# Patient Record
Sex: FEMALE | Race: WHITE | NOT HISPANIC OR LATINO | Employment: OTHER | URBAN - METROPOLITAN AREA
[De-identification: names, ages, dates, MRNs, and addresses within clinical notes are randomized per-mention and may not be internally consistent; named-entity substitution may affect disease eponyms.]

---

## 2017-03-16 ENCOUNTER — HOSPITAL ENCOUNTER (OUTPATIENT)
Dept: LAB | Facility: MEDICAL CENTER | Age: 72
End: 2017-03-16
Attending: FAMILY MEDICINE
Payer: MEDICARE

## 2017-03-16 ENCOUNTER — HOSPITAL ENCOUNTER (OUTPATIENT)
Dept: RADIOLOGY | Facility: MEDICAL CENTER | Age: 72
End: 2017-03-16
Attending: FAMILY MEDICINE
Payer: MEDICARE

## 2017-03-16 DIAGNOSIS — M54.2 CERVICALGIA: ICD-10-CM

## 2017-03-16 LAB
ALBUMIN SERPL BCP-MCNC: 3.9 G/DL (ref 3.2–4.9)
ALBUMIN/GLOB SERPL: 1.4 G/DL
ALP SERPL-CCNC: 51 U/L (ref 30–99)
ALT SERPL-CCNC: 13 U/L (ref 2–50)
ANION GAP SERPL CALC-SCNC: 4 MMOL/L (ref 0–11.9)
AST SERPL-CCNC: 25 U/L (ref 12–45)
BILIRUB SERPL-MCNC: 1.2 MG/DL (ref 0.1–1.5)
BUN SERPL-MCNC: 15 MG/DL (ref 8–22)
CALCIUM SERPL-MCNC: 9.4 MG/DL (ref 8.5–10.5)
CHLORIDE SERPL-SCNC: 104 MMOL/L (ref 96–112)
CO2 SERPL-SCNC: 28 MMOL/L (ref 20–33)
CREAT SERPL-MCNC: 0.95 MG/DL (ref 0.5–1.4)
GLOBULIN SER CALC-MCNC: 2.7 G/DL (ref 1.9–3.5)
GLUCOSE SERPL-MCNC: 101 MG/DL (ref 65–99)
POTASSIUM SERPL-SCNC: 4.1 MMOL/L (ref 3.6–5.5)
PROT SERPL-MCNC: 6.6 G/DL (ref 6–8.2)
SODIUM SERPL-SCNC: 136 MMOL/L (ref 135–145)
T4 FREE SERPL-MCNC: 0.93 NG/DL (ref 0.53–1.43)
TSH SERPL DL<=0.005 MIU/L-ACNC: 0.22 UIU/ML (ref 0.3–3.7)

## 2017-03-16 PROCEDURE — 36415 COLL VENOUS BLD VENIPUNCTURE: CPT

## 2017-03-16 PROCEDURE — 84443 ASSAY THYROID STIM HORMONE: CPT

## 2017-03-16 PROCEDURE — 72052 X-RAY EXAM NECK SPINE 6/>VWS: CPT

## 2017-03-16 PROCEDURE — 86235 NUCLEAR ANTIGEN ANTIBODY: CPT | Mod: 91

## 2017-03-16 PROCEDURE — 80053 COMPREHEN METABOLIC PANEL: CPT

## 2017-03-16 PROCEDURE — 86038 ANTINUCLEAR ANTIBODIES: CPT

## 2017-03-16 PROCEDURE — 84439 ASSAY OF FREE THYROXINE: CPT

## 2017-03-16 PROCEDURE — 86225 DNA ANTIBODY NATIVE: CPT

## 2017-03-20 LAB
DSDNA AB TITR SER CLIF: ABNORMAL {TITER}
ENA SM IGG SER-ACNC: 0 AU/ML (ref 0–40)
ENA SS-B IGG SER IA-ACNC: 0 AU/ML (ref 0–40)
NUCLEAR IGG SER QL IA: DETECTED
NUCLEAR IGG TITR SER IF: ABNORMAL {TITER}
SSA52 R0ENA AB IGG Q0420: 2 AU/ML (ref 0–40)
SSA60 R0ENA AB IGG Q0419: 0 AU/ML (ref 0–40)
U1 SNRNP IGG SER QL: 0 AU/ML (ref 0–40)

## 2017-05-04 ENCOUNTER — HOSPITAL ENCOUNTER (OUTPATIENT)
Dept: RADIOLOGY | Facility: MEDICAL CENTER | Age: 72
End: 2017-05-04
Attending: ORTHOPAEDIC SURGERY
Payer: MEDICARE

## 2017-05-04 DIAGNOSIS — S63.591A OTHER SPECIFIED SPRAIN OF RIGHT WRIST, INITIAL ENCOUNTER: ICD-10-CM

## 2017-05-04 PROCEDURE — 73200 CT UPPER EXTREMITY W/O DYE: CPT | Mod: RT

## 2018-04-26 ENCOUNTER — HOSPITAL ENCOUNTER (OUTPATIENT)
Facility: MEDICAL CENTER | Age: 73
End: 2018-04-26
Attending: INTERNAL MEDICINE | Admitting: INTERNAL MEDICINE
Payer: MEDICARE

## 2019-06-07 ENCOUNTER — OFFICE VISIT (OUTPATIENT)
Dept: URGENT CARE | Facility: PHYSICIAN GROUP | Age: 74
End: 2019-06-07
Payer: MEDICARE

## 2019-06-07 ENCOUNTER — HOSPITAL ENCOUNTER (OUTPATIENT)
Facility: MEDICAL CENTER | Age: 74
End: 2019-06-07
Attending: NURSE PRACTITIONER
Payer: MEDICARE

## 2019-06-07 VITALS
SYSTOLIC BLOOD PRESSURE: 128 MMHG | OXYGEN SATURATION: 98 % | RESPIRATION RATE: 14 BRPM | HEIGHT: 70 IN | WEIGHT: 199 LBS | DIASTOLIC BLOOD PRESSURE: 82 MMHG | TEMPERATURE: 98.1 F | HEART RATE: 78 BPM | BODY MASS INDEX: 28.49 KG/M2

## 2019-06-07 DIAGNOSIS — N39.0 URINARY TRACT INFECTION WITH HEMATURIA, SITE UNSPECIFIED: ICD-10-CM

## 2019-06-07 DIAGNOSIS — R31.9 URINARY TRACT INFECTION WITH HEMATURIA, SITE UNSPECIFIED: ICD-10-CM

## 2019-06-07 DIAGNOSIS — R31.9 URINARY TRACT INFECTION WITH HEMATURIA, SITE UNSPECIFIED: Primary | ICD-10-CM

## 2019-06-07 DIAGNOSIS — N39.0 URINARY TRACT INFECTION WITH HEMATURIA, SITE UNSPECIFIED: Primary | ICD-10-CM

## 2019-06-07 PROCEDURE — 87077 CULTURE AEROBIC IDENTIFY: CPT

## 2019-06-07 PROCEDURE — 87086 URINE CULTURE/COLONY COUNT: CPT

## 2019-06-07 PROCEDURE — 87186 SC STD MICRODIL/AGAR DIL: CPT

## 2019-06-07 PROCEDURE — 99214 OFFICE O/P EST MOD 30 MIN: CPT | Performed by: NURSE PRACTITIONER

## 2019-06-07 RX ORDER — LITHIUM CARBONATE 300 MG
300 TABLET ORAL
Refills: 3 | COMMUNITY
Start: 2019-04-18 | End: 2023-12-31

## 2019-06-07 RX ORDER — QUETIAPINE FUMARATE 50 MG/1
TABLET, EXTENDED RELEASE ORAL
COMMUNITY
End: 2023-12-31

## 2019-06-07 RX ORDER — TRAMADOL HYDROCHLORIDE 50 MG/1
50 TABLET ORAL EVERY 4 HOURS PRN
COMMUNITY
End: 2023-12-31

## 2019-06-07 RX ORDER — CIPROFLOXACIN 500 MG/1
500 TABLET, FILM COATED ORAL 2 TIMES DAILY
Qty: 14 TAB | Refills: 0 | Status: SHIPPED | OUTPATIENT
Start: 2019-06-07 | End: 2019-06-14

## 2019-06-07 NOTE — PROGRESS NOTES
Subjective:     Meena Martin is a 74 y.o. female who presents for Dysuria (x 2 weeks)       Dysuria    This is a new problem. The problem has been gradually worsening. There has been no fever. Associated symptoms include frequency and urgency. Pertinent negatives include no nausea or vomiting. Her past medical history is significant for recurrent UTIs.     Patient presents to urgent care with reports of urinary urgency, frequency, dysuria, low back pain, and abdominal pain worsening over the past 2 weeks.  Reports a history of UTIs. Denies nausea, fever, vomiting, or other symptoms.    PMH:  has a past medical history of Arthritis; Backpain; bipolar; Cholecystitis; Diverticulitis; gastroenteritis; Hypothyroidism; IBS (irritable bowel syndrome); Indigestion; Lupus; Other specified symptom associated with female genital organs; and Pyelonephritis. She also has no past medical history of Breast cancer (HCC).    MEDS:   Current Outpatient Prescriptions:   •  lithium (ESKALITH) 300 MG Tab, Take 300 mg by mouth., Disp: , Rfl: 3  •  tramadol (ULTRAM) 50 MG Tab, Take 50 mg by mouth every four hours as needed., Disp: , Rfl:   •  QUEtiapine Fumarate 50 MG TABLET SR 24 HR, Take  by mouth., Disp: , Rfl:   •  ciprofloxacin (CIPRO) 500 MG Tab, Take 1 Tab by mouth 2 times a day for 7 days., Disp: 14 Tab, Rfl: 0  •  gabapentin (NEURONTIN) 100 MG Cap, Take 1 Cap by mouth 2 Times a Day., Disp: 90 Cap, Rfl: 0  •  zolpidem (AMBIEN) 10 MG TABS, Take 10 mg by mouth at bedtime as needed., Disp: , Rfl:   •  hyoscyamine (HYOMAX-SR) 0.375 MG CR tablet, Take 375 mcg by mouth every day., Disp: , Rfl:   •  pantoprazole (PROTONIX) 40 MG TBEC, Take 40 mg by mouth every day., Disp: , Rfl:   •  levothyroxine (SYNTHROID) 112 MCG TABS, Take 112 mcg by mouth every day., Disp: , Rfl:   •  simvastatin (ZOCOR) 40 MG TABS, Take 40 mg by mouth every evening., Disp: , Rfl:   •  Calcium Carbonate-Vit D-Min (CALCIUM 1200 PO), Take  by mouth every day.,  Disp: , Rfl:   •  Cholecalciferol (VITAMIN D-3 PO), Take 1 Tab by mouth every day. Indications: PT DOESN'T KNOW DOSAGE, Disp: , Rfl:   •  benzonatate (TESSALON) 200 MG capsule, Take 1 Cap by mouth 3 times a day as needed for Cough., Disp: 30 Cap, Rfl: 0  •  promethazine-codeine (PHENERGAN-CODEINE) 6.25-10 MG/5ML Syrup, Take 5 mL by mouth 4 times a day as needed for Cough., Disp: 120 mL, Rfl: 0  •  albuterol (VENTOLIN OR PROVENTIL) 108 (90 BASE) MCG/ACT AERS inhalation aerosol, Inhale 2 Puffs by mouth every 6 hours as needed for Shortness of Breath., Disp: 8.5 g, Rfl: 3  •  guaifenesin-codeine (CHERATUSSIN AC) SOLN, Take 5 mL by mouth every four hours as needed for Cough., Disp: 180 mL, Rfl: 0  •  NON SPECIFIED, Take 1 Tab by mouth every day. Indications: Thermatabs OTC, sodium replacement tabs, Disp: , Rfl:   •  CRANBERRY PO, Take 4,200 mg by mouth every day., Disp: , Rfl:     ALLERGIES:   Allergies   Allergen Reactions   • Morphine Anxiety     Red and rashes     SURGHX:   Past Surgical History:   Procedure Laterality Date   • GASTROSCOPY WITH BIOPSY  5/24/2012    Performed by JAY CHAVES at ENDOSCOPY [facility] ORS   • APPENDECTOMY     • CHOLECYSTECTOMY     • GYN SURGERY      hysterectomy   • OTHER      thyroidectomy,      SOCHX:  reports that she has never smoked. She has never used smokeless tobacco. She reports that she does not drink alcohol or use drugs.     FH: Reviewed with patient, not pertinent to this visit.     Review of Systems   Constitutional: Negative.  Negative for fever and malaise/fatigue.   Respiratory: Negative.  Negative for shortness of breath.    Cardiovascular: Negative.  Negative for chest pain.   Gastrointestinal: Positive for abdominal pain. Negative for constipation, diarrhea, nausea and vomiting.   Genitourinary: Positive for dysuria, frequency and urgency.   Musculoskeletal: Positive for back pain.   Neurological: Negative.    All other systems reviewed and are  "negative.    Objective:     /82   Pulse 78   Temp 36.7 °C (98.1 °F)   Resp 14   Ht 1.778 m (5' 10\")   Wt 90.3 kg (199 lb)   SpO2 98%   BMI 28.55 kg/m²     Physical Exam   Constitutional: She is oriented to person, place, and time. She appears well-developed and well-nourished. She is cooperative.  Non-toxic appearance. No distress.   HENT:   Right Ear: External ear normal.   Left Ear: External ear normal.   Nose: Nose normal.   Mouth/Throat: Mucous membranes are normal.   Eyes: Conjunctivae and EOM are normal.   Neck: Normal range of motion.   Cardiovascular: Normal rate, regular rhythm, normal heart sounds and normal pulses.    Pulmonary/Chest: Effort normal and breath sounds normal. No respiratory distress. She has no decreased breath sounds.   Abdominal: Soft. Bowel sounds are normal. There is tenderness in the right lower quadrant, suprapubic area and left lower quadrant. There is CVA tenderness (Right).   Musculoskeletal: Normal range of motion. She exhibits no deformity.   Neurological: She is alert and oriented to person, place, and time. She has normal strength. No sensory deficit.   Skin: Skin is warm, dry and intact. Capillary refill takes less than 2 seconds.   Psychiatric: She has a normal mood and affect. Her behavior is normal.   Vitals reviewed.    UA: cloudy, positive for nitrites, large LE, trace blood       Assessment/Plan:     1. Urinary tract infection with hematuria, site unspecified  - ciprofloxacin (CIPRO) 500 MG Tab; Take 1 Tab by mouth 2 times a day for 7 days.  Dispense: 14 Tab; Refill: 0  - URINE CULTURE(NEW); Future  - POCT Urinalysis    UA cloudy and positive for nitrites, large LE, and trace blood. Patient reports dysuria, urgency, frequency, low back pain, and history of UTIs. Reports that symptoms feel similar. Urine culture pending. Rx sent electronically for Cipro. Patient advised of potential adverse effects of fluoroquinolones.    VS stable, afebrile, NAD. Discussed " close monitoring and supportive measures including increasing fluids and rest as well as OTC symptom management including acetaminophen and/or ibuprofen PRN pain.    Patient advised to: Return for 1) Symptoms don't improve or worsen, or go to ER, 2) Follow up with primary care in 7-10 days.    Differential diagnosis, natural history, supportive care, and indications for immediate follow-up discussed. All questions answered. Patient agrees with the plan of care.

## 2019-06-08 ASSESSMENT — ENCOUNTER SYMPTOMS
CONSTITUTIONAL NEGATIVE: 1
FEVER: 0
BACK PAIN: 1
NEUROLOGICAL NEGATIVE: 1
CARDIOVASCULAR NEGATIVE: 1
RESPIRATORY NEGATIVE: 1
NAUSEA: 0
SHORTNESS OF BREATH: 0
ABDOMINAL PAIN: 1
CONSTIPATION: 0
VOMITING: 0
DIARRHEA: 0

## 2019-06-10 LAB
BACTERIA UR CULT: ABNORMAL
BACTERIA UR CULT: ABNORMAL
SIGNIFICANT IND 70042: ABNORMAL
SITE SITE: ABNORMAL
SOURCE SOURCE: ABNORMAL

## 2019-12-04 ENCOUNTER — HOSPITAL ENCOUNTER (OUTPATIENT)
Dept: LAB | Facility: MEDICAL CENTER | Age: 74
End: 2019-12-04
Attending: FAMILY MEDICINE
Payer: MEDICARE

## 2019-12-04 LAB
ALBUMIN SERPL BCP-MCNC: 4 G/DL (ref 3.2–4.9)
ALBUMIN/GLOB SERPL: 1.4 G/DL
ALP SERPL-CCNC: 51 U/L (ref 30–99)
ALT SERPL-CCNC: 11 U/L (ref 2–50)
ANION GAP SERPL CALC-SCNC: 7 MMOL/L (ref 0–11.9)
AST SERPL-CCNC: 17 U/L (ref 12–45)
BASOPHILS # BLD AUTO: 1.1 % (ref 0–1.8)
BASOPHILS # BLD: 0.06 K/UL (ref 0–0.12)
BILIRUB SERPL-MCNC: 0.7 MG/DL (ref 0.1–1.5)
BUN SERPL-MCNC: 14 MG/DL (ref 8–22)
CALCIUM SERPL-MCNC: 9.2 MG/DL (ref 8.5–10.5)
CHLORIDE SERPL-SCNC: 108 MMOL/L (ref 96–112)
CO2 SERPL-SCNC: 25 MMOL/L (ref 20–33)
CREAT SERPL-MCNC: 1.27 MG/DL (ref 0.5–1.4)
EOSINOPHIL # BLD AUTO: 0.18 K/UL (ref 0–0.51)
EOSINOPHIL NFR BLD: 3.2 % (ref 0–6.9)
ERYTHROCYTE [DISTWIDTH] IN BLOOD BY AUTOMATED COUNT: 46.3 FL (ref 35.9–50)
GLOBULIN SER CALC-MCNC: 2.9 G/DL (ref 1.9–3.5)
GLUCOSE SERPL-MCNC: 105 MG/DL (ref 65–99)
HCT VFR BLD AUTO: 40.6 % (ref 37–47)
HGB BLD-MCNC: 12.9 G/DL (ref 12–16)
IMM GRANULOCYTES # BLD AUTO: 0.01 K/UL (ref 0–0.11)
IMM GRANULOCYTES NFR BLD AUTO: 0.2 % (ref 0–0.9)
LYMPHOCYTES # BLD AUTO: 1.78 K/UL (ref 1–4.8)
LYMPHOCYTES NFR BLD: 31.6 % (ref 22–41)
MCH RBC QN AUTO: 30.1 PG (ref 27–33)
MCHC RBC AUTO-ENTMCNC: 31.8 G/DL (ref 33.6–35)
MCV RBC AUTO: 94.6 FL (ref 81.4–97.8)
MONOCYTES # BLD AUTO: 0.45 K/UL (ref 0–0.85)
MONOCYTES NFR BLD AUTO: 8 % (ref 0–13.4)
NEUTROPHILS # BLD AUTO: 3.15 K/UL (ref 2–7.15)
NEUTROPHILS NFR BLD: 55.9 % (ref 44–72)
NRBC # BLD AUTO: 0 K/UL
NRBC BLD-RTO: 0 /100 WBC
PLATELET # BLD AUTO: 253 K/UL (ref 164–446)
PMV BLD AUTO: 10.8 FL (ref 9–12.9)
POTASSIUM SERPL-SCNC: 4.4 MMOL/L (ref 3.6–5.5)
PROT SERPL-MCNC: 6.9 G/DL (ref 6–8.2)
RBC # BLD AUTO: 4.29 M/UL (ref 4.2–5.4)
SODIUM SERPL-SCNC: 140 MMOL/L (ref 135–145)
WBC # BLD AUTO: 5.6 K/UL (ref 4.8–10.8)

## 2019-12-04 PROCEDURE — 80053 COMPREHEN METABOLIC PANEL: CPT

## 2019-12-04 PROCEDURE — 85025 COMPLETE CBC W/AUTO DIFF WBC: CPT

## 2019-12-04 PROCEDURE — 36415 COLL VENOUS BLD VENIPUNCTURE: CPT

## 2021-01-04 ENCOUNTER — HOSPITAL ENCOUNTER (OUTPATIENT)
Dept: LAB | Facility: MEDICAL CENTER | Age: 76
End: 2021-01-04
Attending: FAMILY MEDICINE
Payer: MEDICARE

## 2021-01-04 LAB
ALBUMIN SERPL BCP-MCNC: 3.9 G/DL (ref 3.2–4.9)
ALBUMIN/GLOB SERPL: 1.3 G/DL
ALP SERPL-CCNC: 64 U/L (ref 30–99)
ALT SERPL-CCNC: 17 U/L (ref 2–50)
ANION GAP SERPL CALC-SCNC: 10 MMOL/L (ref 7–16)
APPEARANCE UR: CLEAR
AST SERPL-CCNC: 25 U/L (ref 12–45)
BACTERIA #/AREA URNS HPF: NEGATIVE /HPF
BASOPHILS # BLD AUTO: 0.8 % (ref 0–1.8)
BASOPHILS # BLD: 0.06 K/UL (ref 0–0.12)
BILIRUB SERPL-MCNC: 0.7 MG/DL (ref 0.1–1.5)
BILIRUB UR QL STRIP.AUTO: NEGATIVE
BUN SERPL-MCNC: 15 MG/DL (ref 8–22)
CALCIUM SERPL-MCNC: 9.7 MG/DL (ref 8.5–10.5)
CHLORIDE SERPL-SCNC: 102 MMOL/L (ref 96–112)
CO2 SERPL-SCNC: 24 MMOL/L (ref 20–33)
COLOR UR: YELLOW
CREAT SERPL-MCNC: 1.34 MG/DL (ref 0.5–1.4)
EOSINOPHIL # BLD AUTO: 0.35 K/UL (ref 0–0.51)
EOSINOPHIL NFR BLD: 4.7 % (ref 0–6.9)
EPI CELLS #/AREA URNS HPF: NORMAL /HPF
ERYTHROCYTE [DISTWIDTH] IN BLOOD BY AUTOMATED COUNT: 47.7 FL (ref 35.9–50)
FOLATE SERPL-MCNC: 17.8 NG/ML
GLOBULIN SER CALC-MCNC: 3.1 G/DL (ref 1.9–3.5)
GLUCOSE SERPL-MCNC: 85 MG/DL (ref 65–99)
GLUCOSE UR STRIP.AUTO-MCNC: NEGATIVE MG/DL
HCT VFR BLD AUTO: 42.1 % (ref 37–47)
HGB BLD-MCNC: 13.6 G/DL (ref 12–16)
HYALINE CASTS #/AREA URNS LPF: NORMAL /LPF
IMM GRANULOCYTES # BLD AUTO: 0.02 K/UL (ref 0–0.11)
IMM GRANULOCYTES NFR BLD AUTO: 0.3 % (ref 0–0.9)
KETONES UR STRIP.AUTO-MCNC: NEGATIVE MG/DL
LEUKOCYTE ESTERASE UR QL STRIP.AUTO: ABNORMAL
LYMPHOCYTES # BLD AUTO: 3.43 K/UL (ref 1–4.8)
LYMPHOCYTES NFR BLD: 46 % (ref 22–41)
MCH RBC QN AUTO: 30.7 PG (ref 27–33)
MCHC RBC AUTO-ENTMCNC: 32.3 G/DL (ref 33.6–35)
MCV RBC AUTO: 95 FL (ref 81.4–97.8)
MICRO URNS: ABNORMAL
MONOCYTES # BLD AUTO: 0.51 K/UL (ref 0–0.85)
MONOCYTES NFR BLD AUTO: 6.8 % (ref 0–13.4)
NEUTROPHILS # BLD AUTO: 3.09 K/UL (ref 2–7.15)
NEUTROPHILS NFR BLD: 41.4 % (ref 44–72)
NITRITE UR QL STRIP.AUTO: NEGATIVE
NRBC # BLD AUTO: 0 K/UL
NRBC BLD-RTO: 0 /100 WBC
PH UR STRIP.AUTO: 6.5 [PH] (ref 5–8)
PLATELET # BLD AUTO: 318 K/UL (ref 164–446)
PMV BLD AUTO: 10.3 FL (ref 9–12.9)
POTASSIUM SERPL-SCNC: 4.2 MMOL/L (ref 3.6–5.5)
PROT SERPL-MCNC: 7 G/DL (ref 6–8.2)
PROT UR QL STRIP: NEGATIVE MG/DL
RBC # BLD AUTO: 4.43 M/UL (ref 4.2–5.4)
RBC # URNS HPF: NORMAL /HPF
RBC UR QL AUTO: NEGATIVE
SODIUM SERPL-SCNC: 136 MMOL/L (ref 135–145)
SP GR UR STRIP.AUTO: 1.01
T3FREE SERPL-MCNC: 2.26 PG/ML (ref 2–4.4)
T4 FREE SERPL-MCNC: 1.04 NG/DL (ref 0.93–1.7)
TSH SERPL DL<=0.005 MIU/L-ACNC: 2.94 UIU/ML (ref 0.38–5.33)
UROBILINOGEN UR STRIP.AUTO-MCNC: 0.2 MG/DL
VIT B12 SERPL-MCNC: 379 PG/ML (ref 211–911)
WBC # BLD AUTO: 7.5 K/UL (ref 4.8–10.8)
WBC #/AREA URNS HPF: NORMAL /HPF

## 2021-01-04 PROCEDURE — 84439 ASSAY OF FREE THYROXINE: CPT

## 2021-01-04 PROCEDURE — 82607 VITAMIN B-12: CPT

## 2021-01-04 PROCEDURE — 81001 URINALYSIS AUTO W/SCOPE: CPT

## 2021-01-04 PROCEDURE — 80053 COMPREHEN METABOLIC PANEL: CPT

## 2021-01-04 PROCEDURE — 85025 COMPLETE CBC W/AUTO DIFF WBC: CPT

## 2021-01-04 PROCEDURE — 84481 FREE ASSAY (FT-3): CPT

## 2021-01-04 PROCEDURE — 82746 ASSAY OF FOLIC ACID SERUM: CPT

## 2021-01-04 PROCEDURE — 36415 COLL VENOUS BLD VENIPUNCTURE: CPT

## 2021-01-04 PROCEDURE — 84443 ASSAY THYROID STIM HORMONE: CPT

## 2021-01-15 DIAGNOSIS — Z23 NEED FOR VACCINATION: ICD-10-CM

## 2021-08-10 ENCOUNTER — HOSPITAL ENCOUNTER (OUTPATIENT)
Dept: LAB | Facility: MEDICAL CENTER | Age: 76
End: 2021-08-10
Attending: FAMILY MEDICINE
Payer: MEDICARE

## 2021-08-10 ENCOUNTER — HOSPITAL ENCOUNTER (OUTPATIENT)
Dept: RADIOLOGY | Facility: MEDICAL CENTER | Age: 76
End: 2021-08-10
Attending: PHYSICIAN ASSISTANT
Payer: MEDICARE

## 2021-08-10 DIAGNOSIS — N39.0 URINARY TRACT INFECTION WITHOUT HEMATURIA, SITE UNSPECIFIED: ICD-10-CM

## 2021-08-10 LAB
ALBUMIN SERPL BCP-MCNC: 4 G/DL (ref 3.2–4.9)
ALBUMIN/GLOB SERPL: 1.3 G/DL
ALP SERPL-CCNC: 53 U/L (ref 30–99)
ALT SERPL-CCNC: 22 U/L (ref 2–50)
ANION GAP SERPL CALC-SCNC: 13 MMOL/L (ref 7–16)
AST SERPL-CCNC: 38 U/L (ref 12–45)
BASOPHILS # BLD AUTO: 1 % (ref 0–1.8)
BASOPHILS # BLD: 0.07 K/UL (ref 0–0.12)
BILIRUB SERPL-MCNC: 0.8 MG/DL (ref 0.1–1.5)
BUN SERPL-MCNC: 14 MG/DL (ref 8–22)
CALCIUM SERPL-MCNC: 9.7 MG/DL (ref 8.5–10.5)
CHLORIDE SERPL-SCNC: 97 MMOL/L (ref 96–112)
CO2 SERPL-SCNC: 23 MMOL/L (ref 20–33)
CREAT SERPL-MCNC: 1.44 MG/DL (ref 0.5–1.4)
EOSINOPHIL # BLD AUTO: 0.27 K/UL (ref 0–0.51)
EOSINOPHIL NFR BLD: 4 % (ref 0–6.9)
ERYTHROCYTE [DISTWIDTH] IN BLOOD BY AUTOMATED COUNT: 49.3 FL (ref 35.9–50)
GLOBULIN SER CALC-MCNC: 3 G/DL (ref 1.9–3.5)
GLUCOSE SERPL-MCNC: 78 MG/DL (ref 65–99)
HCT VFR BLD AUTO: 39.8 % (ref 37–47)
HGB BLD-MCNC: 12.7 G/DL (ref 12–16)
IMM GRANULOCYTES # BLD AUTO: 0.02 K/UL (ref 0–0.11)
IMM GRANULOCYTES NFR BLD AUTO: 0.3 % (ref 0–0.9)
LYMPHOCYTES # BLD AUTO: 1.54 K/UL (ref 1–4.8)
LYMPHOCYTES NFR BLD: 23 % (ref 22–41)
MCH RBC QN AUTO: 31.1 PG (ref 27–33)
MCHC RBC AUTO-ENTMCNC: 31.9 G/DL (ref 33.6–35)
MCV RBC AUTO: 97.5 FL (ref 81.4–97.8)
MONOCYTES # BLD AUTO: 0.47 K/UL (ref 0–0.85)
MONOCYTES NFR BLD AUTO: 7 % (ref 0–13.4)
NEUTROPHILS # BLD AUTO: 4.32 K/UL (ref 2–7.15)
NEUTROPHILS NFR BLD: 64.7 % (ref 44–72)
NRBC # BLD AUTO: 0 K/UL
NRBC BLD-RTO: 0 /100 WBC
PLATELET # BLD AUTO: 249 K/UL (ref 164–446)
PMV BLD AUTO: 10.3 FL (ref 9–12.9)
POTASSIUM SERPL-SCNC: 5 MMOL/L (ref 3.6–5.5)
PROT SERPL-MCNC: 7 G/DL (ref 6–8.2)
RBC # BLD AUTO: 4.08 M/UL (ref 4.2–5.4)
RHEUMATOID FACT SER IA-ACNC: <10 IU/ML (ref 0–14)
SODIUM SERPL-SCNC: 133 MMOL/L (ref 135–145)
TSH SERPL DL<=0.005 MIU/L-ACNC: 4.43 UIU/ML (ref 0.38–5.33)
WBC # BLD AUTO: 6.7 K/UL (ref 4.8–10.8)

## 2021-08-10 PROCEDURE — 76775 US EXAM ABDO BACK WALL LIM: CPT

## 2021-08-10 PROCEDURE — 86431 RHEUMATOID FACTOR QUANT: CPT

## 2021-08-10 PROCEDURE — 36415 COLL VENOUS BLD VENIPUNCTURE: CPT

## 2021-08-10 PROCEDURE — 86225 DNA ANTIBODY NATIVE: CPT

## 2021-08-10 PROCEDURE — 86038 ANTINUCLEAR ANTIBODIES: CPT

## 2021-08-10 PROCEDURE — 80053 COMPREHEN METABOLIC PANEL: CPT

## 2021-08-10 PROCEDURE — 86235 NUCLEAR ANTIGEN ANTIBODY: CPT | Mod: 91

## 2021-08-10 PROCEDURE — 84443 ASSAY THYROID STIM HORMONE: CPT

## 2021-08-10 PROCEDURE — 85025 COMPLETE CBC W/AUTO DIFF WBC: CPT

## 2021-08-10 PROCEDURE — 86039 ANTINUCLEAR ANTIBODIES (ANA): CPT

## 2021-08-13 LAB — NUCLEAR IGG SER QL IA: DETECTED

## 2021-08-15 LAB
ANA INTERPRETIVE COMMENT Q5143: ABNORMAL
ANA PATTERN Q5144: ABNORMAL
ANA TITER Q5145: ABNORMAL
ANTINUCLEAR ANTIBODY (ANA), HEP-2, IGG Q5142: DETECTED

## 2021-08-16 LAB
DSDNA AB TITR SER CLIF: 13 IU (ref 0–24)
ENA JO1 AB TITR SER: 7 AU/ML (ref 0–40)
ENA SCL70 IGG SER QL: 4 AU/ML (ref 0–40)
ENA SM IGG SER-ACNC: 2 AU/ML (ref 0–40)
ENA SS-B IGG SER IA-ACNC: 0 AU/ML (ref 0–40)
SSA52 R0ENA AB IGG Q0420: 8 AU/ML (ref 0–40)
SSA60 R0ENA AB IGG Q0419: 0 AU/ML (ref 0–40)

## 2021-08-17 LAB — U1 SNRNP IGG SER QL: NORMAL

## 2021-11-02 ENCOUNTER — HOSPITAL ENCOUNTER (OUTPATIENT)
Dept: RADIOLOGY | Facility: MEDICAL CENTER | Age: 76
End: 2021-11-02
Attending: INTERNAL MEDICINE
Payer: MEDICARE

## 2021-11-02 ENCOUNTER — HOSPITAL ENCOUNTER (OUTPATIENT)
Dept: LAB | Facility: MEDICAL CENTER | Age: 76
End: 2021-11-02
Attending: SPECIALIST
Payer: MEDICARE

## 2021-11-02 ENCOUNTER — HOSPITAL ENCOUNTER (OUTPATIENT)
Facility: MEDICAL CENTER | Age: 76
End: 2021-11-02
Attending: PHYSICIAN ASSISTANT
Payer: MEDICARE

## 2021-11-02 DIAGNOSIS — M79.641 RIGHT HAND PAIN: ICD-10-CM

## 2021-11-02 DIAGNOSIS — M25.561 RIGHT KNEE PAIN, UNSPECIFIED CHRONICITY: ICD-10-CM

## 2021-11-02 DIAGNOSIS — M79.642 LEFT HAND PAIN: ICD-10-CM

## 2021-11-02 DIAGNOSIS — M25.562 LEFT KNEE PAIN, UNSPECIFIED CHRONICITY: ICD-10-CM

## 2021-11-02 DIAGNOSIS — M54.2 CERVICALGIA: ICD-10-CM

## 2021-11-02 DIAGNOSIS — M54.6 PAIN IN THORACIC SPINE: ICD-10-CM

## 2021-11-02 LAB
C3 SERPL-MCNC: 113.4 MG/DL (ref 87–200)
C4 SERPL-MCNC: 36.9 MG/DL (ref 19–52)

## 2021-11-02 PROCEDURE — 87086 URINE CULTURE/COLONY COUNT: CPT

## 2021-11-02 PROCEDURE — 77077 JOINT SURVEY SINGLE VIEW: CPT

## 2021-11-02 PROCEDURE — 72070 X-RAY EXAM THORAC SPINE 2VWS: CPT

## 2021-11-02 PROCEDURE — 72040 X-RAY EXAM NECK SPINE 2-3 VW: CPT

## 2021-11-02 PROCEDURE — 73565 X-RAY EXAM OF KNEES: CPT

## 2021-11-02 PROCEDURE — 86039 ANTINUCLEAR ANTIBODIES (ANA): CPT

## 2021-11-02 PROCEDURE — 73560 X-RAY EXAM OF KNEE 1 OR 2: CPT | Mod: LT

## 2021-11-02 PROCEDURE — 73560 X-RAY EXAM OF KNEE 1 OR 2: CPT | Mod: RT

## 2021-11-02 PROCEDURE — 86038 ANTINUCLEAR ANTIBODIES: CPT

## 2021-11-02 PROCEDURE — 86235 NUCLEAR ANTIGEN ANTIBODY: CPT | Mod: 91

## 2021-11-02 PROCEDURE — 36415 COLL VENOUS BLD VENIPUNCTURE: CPT

## 2021-11-02 PROCEDURE — 86160 COMPLEMENT ANTIGEN: CPT | Mod: 91

## 2021-11-02 PROCEDURE — 86225 DNA ANTIBODY NATIVE: CPT

## 2021-11-04 LAB
ENA SM IGG SER-ACNC: 0 AU/ML (ref 0–40)
ENA SS-B IGG SER IA-ACNC: 0 AU/ML (ref 0–40)
SSA52 R0ENA AB IGG Q0420: 3 AU/ML (ref 0–40)
SSA60 R0ENA AB IGG Q0419: 0 AU/ML (ref 0–40)

## 2021-11-05 LAB
BACTERIA UR CULT: NORMAL
DSDNA AB TITR SER CLIF: 7 IU (ref 0–24)
NUCLEAR IGG SER QL IA: DETECTED
SIGNIFICANT IND 70042: NORMAL
SITE SITE: NORMAL
SOURCE SOURCE: NORMAL

## 2021-11-06 LAB
ANA INTERPRETIVE COMMENT Q5143: ABNORMAL
ANA PATTERN Q5144: ABNORMAL
ANA TITER Q5145: ABNORMAL
ANTINUCLEAR ANTIBODY (ANA), HEP-2, IGG Q5142: DETECTED
U1 SNRNP IGG SER QL: 5

## 2022-01-13 ENCOUNTER — HOSPITAL ENCOUNTER (EMERGENCY)
Facility: MEDICAL CENTER | Age: 77
End: 2022-01-13
Attending: EMERGENCY MEDICINE
Payer: MEDICARE

## 2022-01-13 VITALS
DIASTOLIC BLOOD PRESSURE: 70 MMHG | TEMPERATURE: 97.2 F | SYSTOLIC BLOOD PRESSURE: 126 MMHG | BODY MASS INDEX: 32.29 KG/M2 | WEIGHT: 218 LBS | OXYGEN SATURATION: 97 % | RESPIRATION RATE: 16 BRPM | HEART RATE: 80 BPM | HEIGHT: 69 IN

## 2022-01-13 DIAGNOSIS — N30.01 ACUTE CYSTITIS WITH HEMATURIA: ICD-10-CM

## 2022-01-13 DIAGNOSIS — K29.00 ACUTE GASTRITIS WITHOUT HEMORRHAGE, UNSPECIFIED GASTRITIS TYPE: ICD-10-CM

## 2022-01-13 LAB
ALBUMIN SERPL BCP-MCNC: 4.4 G/DL (ref 3.2–4.9)
ALBUMIN/GLOB SERPL: 1.5 G/DL
ALP SERPL-CCNC: 64 U/L (ref 30–99)
ALT SERPL-CCNC: 18 U/L (ref 2–50)
ANION GAP SERPL CALC-SCNC: 14 MMOL/L (ref 7–16)
APPEARANCE UR: ABNORMAL
AST SERPL-CCNC: 26 U/L (ref 12–45)
BACTERIA #/AREA URNS HPF: ABNORMAL /HPF
BASOPHILS # BLD AUTO: 0.9 % (ref 0–1.8)
BASOPHILS # BLD: 0.07 K/UL (ref 0–0.12)
BILIRUB SERPL-MCNC: 0.6 MG/DL (ref 0.1–1.5)
BILIRUB UR QL STRIP.AUTO: NEGATIVE
BUN SERPL-MCNC: 17 MG/DL (ref 8–22)
CALCIUM SERPL-MCNC: 9.8 MG/DL (ref 8.5–10.5)
CHLORIDE SERPL-SCNC: 100 MMOL/L (ref 96–112)
CO2 SERPL-SCNC: 20 MMOL/L (ref 20–33)
COLOR UR: YELLOW
CREAT SERPL-MCNC: 1.34 MG/DL (ref 0.5–1.4)
EKG IMPRESSION: NORMAL
EOSINOPHIL # BLD AUTO: 0.1 K/UL (ref 0–0.51)
EOSINOPHIL NFR BLD: 1.2 % (ref 0–6.9)
EPI CELLS #/AREA URNS HPF: ABNORMAL /HPF
ERYTHROCYTE [DISTWIDTH] IN BLOOD BY AUTOMATED COUNT: 43.6 FL (ref 35.9–50)
FLUAV RNA SPEC QL NAA+PROBE: NEGATIVE
FLUBV RNA SPEC QL NAA+PROBE: NEGATIVE
GLOBULIN SER CALC-MCNC: 3 G/DL (ref 1.9–3.5)
GLUCOSE SERPL-MCNC: 111 MG/DL (ref 65–99)
GLUCOSE UR STRIP.AUTO-MCNC: NEGATIVE MG/DL
HCT VFR BLD AUTO: 42.6 % (ref 37–47)
HGB BLD-MCNC: 13.9 G/DL (ref 12–16)
HYALINE CASTS #/AREA URNS LPF: ABNORMAL /LPF
IMM GRANULOCYTES # BLD AUTO: 0.03 K/UL (ref 0–0.11)
IMM GRANULOCYTES NFR BLD AUTO: 0.4 % (ref 0–0.9)
KETONES UR STRIP.AUTO-MCNC: NEGATIVE MG/DL
LEUKOCYTE ESTERASE UR QL STRIP.AUTO: ABNORMAL
LIPASE SERPL-CCNC: 49 U/L (ref 11–82)
LYMPHOCYTES # BLD AUTO: 1.51 K/UL (ref 1–4.8)
LYMPHOCYTES NFR BLD: 18.5 % (ref 22–41)
MCH RBC QN AUTO: 30.2 PG (ref 27–33)
MCHC RBC AUTO-ENTMCNC: 32.6 G/DL (ref 33.6–35)
MCV RBC AUTO: 92.4 FL (ref 81.4–97.8)
MICRO URNS: ABNORMAL
MONOCYTES # BLD AUTO: 0.48 K/UL (ref 0–0.85)
MONOCYTES NFR BLD AUTO: 5.9 % (ref 0–13.4)
NEUTROPHILS # BLD AUTO: 5.97 K/UL (ref 2–7.15)
NEUTROPHILS NFR BLD: 73.1 % (ref 44–72)
NITRITE UR QL STRIP.AUTO: POSITIVE
NRBC # BLD AUTO: 0 K/UL
NRBC BLD-RTO: 0 /100 WBC
PH UR STRIP.AUTO: 5.5 [PH] (ref 5–8)
PLATELET # BLD AUTO: 291 K/UL (ref 164–446)
PMV BLD AUTO: 9 FL (ref 9–12.9)
POTASSIUM SERPL-SCNC: 4.2 MMOL/L (ref 3.6–5.5)
PROT SERPL-MCNC: 7.4 G/DL (ref 6–8.2)
PROT UR QL STRIP: NEGATIVE MG/DL
RBC # BLD AUTO: 4.61 M/UL (ref 4.2–5.4)
RBC # URNS HPF: ABNORMAL /HPF
RBC UR QL AUTO: ABNORMAL
RSV RNA SPEC QL NAA+PROBE: NEGATIVE
SARS-COV-2 RNA RESP QL NAA+PROBE: NOTDETECTED
SODIUM SERPL-SCNC: 134 MMOL/L (ref 135–145)
SP GR UR STRIP.AUTO: 1.02
SPECIMEN SOURCE: NORMAL
TROPONIN T SERPL-MCNC: 15 NG/L (ref 6–19)
UROBILINOGEN UR STRIP.AUTO-MCNC: 0.2 MG/DL
WBC # BLD AUTO: 8.2 K/UL (ref 4.8–10.8)
WBC #/AREA URNS HPF: ABNORMAL /HPF

## 2022-01-13 PROCEDURE — 85025 COMPLETE CBC W/AUTO DIFF WBC: CPT

## 2022-01-13 PROCEDURE — 93005 ELECTROCARDIOGRAM TRACING: CPT | Performed by: EMERGENCY MEDICINE

## 2022-01-13 PROCEDURE — 83690 ASSAY OF LIPASE: CPT

## 2022-01-13 PROCEDURE — 700102 HCHG RX REV CODE 250 W/ 637 OVERRIDE(OP): Performed by: EMERGENCY MEDICINE

## 2022-01-13 PROCEDURE — C9803 HOPD COVID-19 SPEC COLLECT: HCPCS | Performed by: EMERGENCY MEDICINE

## 2022-01-13 PROCEDURE — 36415 COLL VENOUS BLD VENIPUNCTURE: CPT

## 2022-01-13 PROCEDURE — 81001 URINALYSIS AUTO W/SCOPE: CPT

## 2022-01-13 PROCEDURE — A9270 NON-COVERED ITEM OR SERVICE: HCPCS | Performed by: EMERGENCY MEDICINE

## 2022-01-13 PROCEDURE — 93005 ELECTROCARDIOGRAM TRACING: CPT

## 2022-01-13 PROCEDURE — 80053 COMPREHEN METABOLIC PANEL: CPT

## 2022-01-13 PROCEDURE — 0241U HCHG SARS-COV-2 COVID-19 NFCT DS RESP RNA 4 TRGT MIC: CPT

## 2022-01-13 PROCEDURE — 96374 THER/PROPH/DIAG INJ IV PUSH: CPT

## 2022-01-13 PROCEDURE — 99285 EMERGENCY DEPT VISIT HI MDM: CPT

## 2022-01-13 PROCEDURE — 700111 HCHG RX REV CODE 636 W/ 250 OVERRIDE (IP): Performed by: EMERGENCY MEDICINE

## 2022-01-13 PROCEDURE — 84484 ASSAY OF TROPONIN QUANT: CPT

## 2022-01-13 RX ORDER — CEFDINIR 300 MG/1
300 CAPSULE ORAL 2 TIMES DAILY
Qty: 20 CAPSULE | Refills: 0 | Status: SHIPPED | OUTPATIENT
Start: 2022-01-13 | End: 2023-12-31

## 2022-01-13 RX ORDER — PANTOPRAZOLE SODIUM 20 MG/1
40 TABLET, DELAYED RELEASE ORAL DAILY
Qty: 30 TABLET | Refills: 0 | Status: SHIPPED | OUTPATIENT
Start: 2022-01-13 | End: 2023-12-31

## 2022-01-13 RX ORDER — CEFTRIAXONE 2 G/1
2 INJECTION, POWDER, FOR SOLUTION INTRAMUSCULAR; INTRAVENOUS ONCE
Status: COMPLETED | OUTPATIENT
Start: 2022-01-13 | End: 2022-01-13

## 2022-01-13 RX ADMIN — LIDOCAINE HYDROCHLORIDE 30 ML: 20 SOLUTION OROPHARYNGEAL at 13:49

## 2022-01-13 RX ADMIN — CEFTRIAXONE SODIUM 2 G: 2 INJECTION, POWDER, FOR SOLUTION INTRAMUSCULAR; INTRAVENOUS at 16:39

## 2022-01-13 ASSESSMENT — LIFESTYLE VARIABLES: DO YOU DRINK ALCOHOL: NO

## 2022-01-13 ASSESSMENT — FIBROSIS 4 INDEX: FIB4 SCORE: 2.44

## 2022-01-13 NOTE — ED NOTES
Assumed care of pt. Introduced self as pt RN.     Agree with triage note. Additionally pt is having chest pain and active s/sx of covid including cough, headache, body aches.   Pt updated on plan of care. Medicated per MAR.   PIV initiated, blood drawn and sent to lab. covid sent to lab.  Pt went to urinate and missed the hat in the toilet.    Returned to bed, connected to monitor.

## 2022-01-13 NOTE — ED TRIAGE NOTES
".  Chief Complaint   Patient presents with   • Painful Urination     c/o urinary retention, burning and pain.    • Epigastric Pain     x's week plus weakness and fatigue.      Pt reports her son testing positive for covid last week and said she has been exposed     ./97   Pulse 66   Temp 36 °C (96.8 °F) (Temporal)   Resp 16   Ht 1.753 m (5' 9\")   Wt 98.9 kg (218 lb)   SpO2 99%      .Explained triage process, to waiting room. Asked to inform RN if questions or concerns arise.   "

## 2022-01-13 NOTE — ED PROVIDER NOTES
"ED Provider Note    Scribed for Roberth England M.D. by Ronan Maldonado. 1/13/2022  1:37 PM    Primary care provider: Francisco Javier Carty M.D.  Means of arrival: EMS  History obtained from: Patient  History limited by: None    CHIEF COMPLAINT  Chief Complaint   Patient presents with   • Painful Urination     c/o urinary retention, burning and pain.    • Epigastric Pain     x's week plus weakness and fatigue.    • Chest Pain   • Headache   • Cough     HPI  Meena Martin is a 76 y.o. female with a history of GERD who presents to the Emergency Department via EMS for acute chest tightness onset earlier today. She describes her chest pain as \"a heaviness\". She states that her son was recently tested positive for COVID last week and she has been exposed. She reports associated lack of appetite, epigastric abdominal pain, urinary retention, dysuria, dizziness, and generalized weakness. No alleviating or exacerbating factors were identified. She denies associated cough or fever. She states she has received her COVID vaccination and booster.    REVIEW OF SYSTEMS  Pertinent negatives include no cough or fever.  See HPI for further details.     PAST MEDICAL HISTORY   has a past medical history of Arthritis, Backpain, bipolar, Cholecystitis, Diverticulitis, gastroenteritis, Hypothyroidism, IBS (irritable bowel syndrome), Indigestion, Lupus (HCC), Other specified symptom associated with female genital organs, and Pyelonephritis.    SURGICAL HISTORY   has a past surgical history that includes cholecystectomy; appendectomy; gyn surgery; other; and gastroscopy with biopsy (5/24/2012).    SOCIAL HISTORY  Social History     Tobacco Use   • Smoking status: Never Smoker   • Smokeless tobacco: Never Used   Substance Use Topics   • Alcohol use: No     Comment: quit   • Drug use: No     Comment: smokes crack / cocaine, quit in march 2008      Social History     Substance and Sexual Activity   Drug Use No    Comment: smokes crack / cocaine, quit in " "march 2008     FAMILY HISTORY  History reviewed. No pertinent family history.    CURRENT MEDICATIONS  Home Medications     Reviewed by Dolores Heredia R.N. (Registered Nurse) on 01/13/22 at 1259  Med List Status: Not Addressed   Medication Last Dose Status   albuterol (VENTOLIN OR PROVENTIL) 108 (90 BASE) MCG/ACT AERS inhalation aerosol  Active   benzonatate (TESSALON) 200 MG capsule  Active   Calcium Carbonate-Vit D-Min (CALCIUM 1200 PO)  Active   Cholecalciferol (VITAMIN D-3 PO)  Active   CRANBERRY PO  Active   gabapentin (NEURONTIN) 100 MG Cap  Active   guaifenesin-codeine (CHERATUSSIN AC) SOLN  Active   hyoscyamine (HYOMAX-SR) 0.375 MG CR tablet  Active   levothyroxine (SYNTHROID) 112 MCG TABS  Active   lithium (ESKALITH) 300 MG Tab  Active   NON SPECIFIED  Active   pantoprazole (PROTONIX) 40 MG TBEC  Active   promethazine-codeine (PHENERGAN-CODEINE) 6.25-10 MG/5ML Syrup  Active   QUEtiapine Fumarate 50 MG TABLET SR 24 HR  Active   simvastatin (ZOCOR) 40 MG TABS  Active   tramadol (ULTRAM) 50 MG Tab  Active   zolpidem (AMBIEN) 10 MG TABS  Active              ALLERGIES  Allergies   Allergen Reactions   • Morphine Anxiety     Red and rashes     PHYSICAL EXAM  VITAL SIGNS: /97   Pulse 66   Temp 36 °C (96.8 °F) (Temporal)   Resp 16   Ht 1.753 m (5' 9\")   Wt 98.9 kg (218 lb)   SpO2 99%   BMI 32.19 kg/m²   Constitutional: Well developed, Well nourished, No acute distress, Non-toxic appearance.   HENT: Normocephalic, Atraumatic, Bilateral external ears normal, Oropharynx is clear mucous membranes are moist. No oral exudates or nasal discharge.   Eyes: Pupils are equal round and reactive, EOMI, Conjunctiva normal, No discharge.   Neck: Normal range of motion, No tenderness, Supple, No stridor. No meningismus.  Lymphatic: No lymphadenopathy noted.   Cardiovascular: Regular rate and rhythm without murmur rub or gallop.  Thorax & Lungs: Clear breath sounds bilaterally without wheezes, rhonchi or rales. There " is no chest wall tenderness.   Abdomen: Soft, Epigastric tenderness, non-distended. There is no rebound or guarding. No organomegaly is appreciated. Bowel sounds are normal.  Skin: Normal without rash.   Back: No CVA or spinal tenderness.   Extremities: Intact distal pulses, No edema, No tenderness, No cyanosis, No clubbing. Capillary refill is less than 2 seconds.  Musculoskeletal: Good range of motion in all major joints. No tenderness to palpation or major deformities noted.   Neurologic: Alert & oriented x 3, Normal motor function, Normal sensory function, No focal deficits noted. Reflexes are normal.  Psychiatric: Affect normal, Judgment normal, Mood normal. There is no suicidal ideation or patient reported hallucinations.     DIAGNOSTIC STUDIES / PROCEDURES    LABS  Labs Reviewed   CBC WITH DIFFERENTIAL - Abnormal; Notable for the following components:       Result Value    MCHC 32.6 (*)     Neutrophils-Polys 73.10 (*)     Lymphocytes 18.50 (*)     All other components within normal limits    Narrative:     Collected By:   COMP METABOLIC PANEL - Abnormal; Notable for the following components:    Sodium 134 (*)     Glucose 111 (*)     All other components within normal limits    Narrative:     Collected By:   URINALYSIS - Abnormal; Notable for the following components:    Character Cloudy (*)     Nitrite Positive (*)     Leukocyte Esterase Large (*)     Occult Blood Trace (*)     All other components within normal limits    Narrative:     Collected By:   ESTIMATED GFR - Abnormal; Notable for the following components:    GFR If  47 (*)     GFR If Non  38 (*)     All other components within normal limits    Narrative:     Collected By:   URINE MICROSCOPIC (W/UA) - Abnormal; Notable for the following components:    WBC Packed (*)     Bacteria Many (*)     All other components within normal limits    Narrative:     Collected By:   LIPASE    Narrative:     Collected By:   COV-2, FLU  A/B, AND RSV BY PCR    Narrative:     Collected By:  Have you been in close contact with a person who is suspected  or known to be positive for COVID-19 within the last 30 days  (e.g. last seen that person < 30 days ago)->Yes   TROPONIN    Narrative:     Collected By:   TROPONIN      All labs reviewed by me.    EKG Interpretation:  Results for orders placed or performed during the hospital encounter of 22   EKG (NOW)   Result Value Ref Range    Report       [facility] Emergency Dept.    Test Date:  2022  Pt Name:    LIBIA JOHN                Department: ER  MRN:        [id number]                      Room:  Gender:     Female                       Technician: [id number]  :        [id number]                   Requested By:ER TRIAGE PROTOCOL  Order #:    [id number]                    Reading MD: SYLVIA MONDRAGON MD    Measurements  Intervals                                Axis  Rate:       63                           P:          23  AR:         147                          QRS:        -2  QRSD:       110                          T:          0  QT:         474  QTc:        486    Interpretive Statements  Sinus rhythm  Low voltage, precordial leads  Borderline T abnormalities, anterior leads  Borderline prolonged QT interval  Compared to ECG 2012 10:31:25  Low QRS voltage now present  Poor R-wave progression no longer present  T-wave abnormality still present  Electronically Signed On 2022 18:33:48 PST by SYLVIA MONDRAGON MD       COURSE & MEDICAL DECISION MAKING  Nursing notes, VS, PMSFHx reviewed in chart.    1:37 PM Patient seen and examined at bedside. Ordered for labs and EKG to evaluate.    KG shows no evidence of acute ischemic changes or dysrhythmia.    2:41 PM Patient will be treated with GI cocktail 30ml. Seemed to help her stomachache and I doubt a cardiac etiology    4:07 PM Patient will be treated with Rocephin 2g.  Robbie evaluation reveals no leukocytosis, shift of  significance, anemia or electrolyte derangements  And lipase is normal ruling out pancreatitis and troponin is unremarkable as well. I think this has good negative predictive value given the timeline of her symptoms. Urinalysis however shows acute UTI with positive nitrites, large leukocytes and many bacteria     4:45 PM - I reevaluated the patient at bedside. I discussed plan for discharge and follow up as outlined below. The patient verbalizes they feel comfortable going home. The patient is stable for discharge at this time and will return for any new or worsening symptoms. Patient verbalizes understanding and support with my plan for discharge.     Patient has had high blood pressure while in the emergency department, felt likely secondary to medical condition. Counseled patient to monitor blood pressure at home and follow up with primary care physician.    The patient is referred to a primary physician for blood pressure management, diabetic screening, and for all other preventative health concerns.    DISPOSITION:  Patient will be discharged home in stable condition.    OUTPATIENT MEDICATIONS:  New Prescriptions    CEFDINIR (OMNICEF) 300 MG CAP    Take 1 Capsule by mouth 2 times a day.    PANTOPRAZOLE (PROTONIX) 20 MG TABLET    Take 2 Tablets by mouth every day.     FINAL IMPRESSION  1. Acute cystitis with hematuria  cefdinir (OMNICEF) 300 MG Cap    pantoprazole (PROTONIX) 20 MG tablet   2. Acute gastritis without hemorrhage, unspecified gastritis type          I, Ronan Maldonado (Denzel), am scribing for, and in the presence of, Roberth England M.D..    Electronically signed by: Ronan Maldonado (Denzel), 1/13/2022    IRoberth M.D. personally performed the services described in this documentation, as scribed by Ronan Maldonado in my presence, and it is both accurate and complete. E.    The note accurately reflects work and decisions made by me.  Roberth England M.D.  1/13/2022  6:30 PM

## 2022-01-14 NOTE — ED NOTES
Reviewed discharge instructions, pt verbalized understanding of instructions and medications. States she will schedule follow-up appointment and go  her meds. Denies further questions at this time. Pt leaving er in stable condition and calling a cab to get home.

## 2022-08-22 ENCOUNTER — HOSPITAL ENCOUNTER (OUTPATIENT)
Dept: LAB | Facility: MEDICAL CENTER | Age: 77
End: 2022-08-22
Attending: FAMILY MEDICINE
Payer: MEDICARE

## 2022-08-22 LAB
ALBUMIN SERPL BCP-MCNC: 4.4 G/DL (ref 3.2–4.9)
ALBUMIN/GLOB SERPL: 1.4 G/DL
ALP SERPL-CCNC: 71 U/L (ref 30–99)
ALT SERPL-CCNC: 15 U/L (ref 2–50)
ANION GAP SERPL CALC-SCNC: 12 MMOL/L (ref 7–16)
AST SERPL-CCNC: 23 U/L (ref 12–45)
BILIRUB SERPL-MCNC: 0.7 MG/DL (ref 0.1–1.5)
BUN SERPL-MCNC: 23 MG/DL (ref 8–22)
CALCIUM SERPL-MCNC: 9.6 MG/DL (ref 8.5–10.5)
CHLORIDE SERPL-SCNC: 103 MMOL/L (ref 96–112)
CHOLEST SERPL-MCNC: 235 MG/DL (ref 100–199)
CO2 SERPL-SCNC: 24 MMOL/L (ref 20–33)
CREAT SERPL-MCNC: 1.49 MG/DL (ref 0.5–1.4)
FASTING STATUS PATIENT QL REPORTED: NORMAL
GFR SERPLBLD CREATININE-BSD FMLA CKD-EPI: 36 ML/MIN/1.73 M 2
GLOBULIN SER CALC-MCNC: 3.1 G/DL (ref 1.9–3.5)
GLUCOSE SERPL-MCNC: 107 MG/DL (ref 65–99)
HDLC SERPL-MCNC: 60 MG/DL
LDLC SERPL CALC-MCNC: 138 MG/DL
POTASSIUM SERPL-SCNC: 3.8 MMOL/L (ref 3.6–5.5)
PROT SERPL-MCNC: 7.5 G/DL (ref 6–8.2)
SODIUM SERPL-SCNC: 139 MMOL/L (ref 135–145)
TRIGL SERPL-MCNC: 184 MG/DL (ref 0–149)

## 2022-08-22 PROCEDURE — 80061 LIPID PANEL: CPT

## 2022-08-22 PROCEDURE — 36415 COLL VENOUS BLD VENIPUNCTURE: CPT

## 2022-08-22 PROCEDURE — 80053 COMPREHEN METABOLIC PANEL: CPT

## 2022-09-12 ENCOUNTER — HOSPITAL ENCOUNTER (OUTPATIENT)
Dept: LAB | Facility: MEDICAL CENTER | Age: 77
End: 2022-09-12
Attending: FAMILY MEDICINE
Payer: MEDICARE

## 2022-09-12 PROCEDURE — 36415 COLL VENOUS BLD VENIPUNCTURE: CPT

## 2022-09-12 PROCEDURE — 84443 ASSAY THYROID STIM HORMONE: CPT

## 2022-09-13 LAB — TSH SERPL DL<=0.005 MIU/L-ACNC: 1.08 UIU/ML (ref 0.38–5.33)

## 2023-12-31 ENCOUNTER — OFFICE VISIT (OUTPATIENT)
Dept: URGENT CARE | Facility: PHYSICIAN GROUP | Age: 78
End: 2023-12-31
Payer: MEDICARE

## 2023-12-31 VITALS
DIASTOLIC BLOOD PRESSURE: 54 MMHG | RESPIRATION RATE: 18 BRPM | BODY MASS INDEX: 28.63 KG/M2 | HEART RATE: 103 BPM | WEIGHT: 200 LBS | OXYGEN SATURATION: 95 % | TEMPERATURE: 97.1 F | SYSTOLIC BLOOD PRESSURE: 122 MMHG | HEIGHT: 70 IN

## 2023-12-31 DIAGNOSIS — N30.01 ACUTE CYSTITIS WITH HEMATURIA: ICD-10-CM

## 2023-12-31 LAB
APPEARANCE UR: ABNORMAL
BILIRUB UR STRIP-MCNC: ABNORMAL MG/DL
COLOR UR AUTO: ABNORMAL
GLUCOSE UR STRIP.AUTO-MCNC: ABNORMAL MG/DL
KETONES UR STRIP.AUTO-MCNC: ABNORMAL MG/DL
LEUKOCYTE ESTERASE UR QL STRIP.AUTO: ABNORMAL
NITRITE UR QL STRIP.AUTO: ABNORMAL
PH UR STRIP.AUTO: 5.5 [PH] (ref 5–8)
PROT UR QL STRIP: 30 MG/DL
RBC UR QL AUTO: ABNORMAL
SP GR UR STRIP.AUTO: 1.02
UROBILINOGEN UR STRIP-MCNC: 0.2 MG/DL

## 2023-12-31 PROCEDURE — 3078F DIAST BP <80 MM HG: CPT | Performed by: NURSE PRACTITIONER

## 2023-12-31 PROCEDURE — 81002 URINALYSIS NONAUTO W/O SCOPE: CPT | Performed by: NURSE PRACTITIONER

## 2023-12-31 PROCEDURE — 3074F SYST BP LT 130 MM HG: CPT | Performed by: NURSE PRACTITIONER

## 2023-12-31 PROCEDURE — 99203 OFFICE O/P NEW LOW 30 MIN: CPT | Performed by: NURSE PRACTITIONER

## 2023-12-31 RX ORDER — CEFDINIR 300 MG/1
300 CAPSULE ORAL 2 TIMES DAILY
Qty: 14 CAPSULE | Refills: 0 | Status: SHIPPED | OUTPATIENT
Start: 2023-12-31 | End: 2024-01-07

## 2023-12-31 ASSESSMENT — ENCOUNTER SYMPTOMS
NAUSEA: 0
FLANK PAIN: 0
BACK PAIN: 0
FEVER: 0
ABDOMINAL PAIN: 1
FATIGUE: 0
CHILLS: 0

## 2023-12-31 ASSESSMENT — FIBROSIS 4 INDEX: FIB4 SCORE: 1.57

## 2023-12-31 NOTE — PROGRESS NOTES
"Subjective:   Meena Martin is a 78 y.o. female who presents for UTI (X 1 wk frequent urgency, pain when urinating.)      UTI  This is a new problem. The problem occurs constantly. The problem has been gradually worsening. Associated symptoms include abdominal pain and urinary symptoms. Pertinent negatives include no chills, fatigue, fever or nausea. She has tried drinking for the symptoms.       Review of Systems   Constitutional:  Negative for chills, fatigue, fever and malaise/fatigue.   Gastrointestinal:  Positive for abdominal pain. Negative for nausea.   Genitourinary:  Positive for dysuria, frequency and urgency. Negative for flank pain and hematuria.   Musculoskeletal:  Negative for back pain.       Medications:    CALCIUM 1200 PO  cefdinir Caps  CRANBERRY PO  gabapentin Caps  levothyroxine Tabs  simvastatin Tabs  VITAMIN D-3 PO    Allergies: Morphine    Problem List: Meena Martin does not have any pertinent problems on file.    Surgical History:  Past Surgical History:   Procedure Laterality Date    GASTROSCOPY WITH BIOPSY  5/24/2012    Performed by JAY CHAVES at ENDOSCOPY [facility] ORS    APPENDECTOMY      CHOLECYSTECTOMY      GYN SURGERY      hysterectomy    OTHER      thyroidectomy,        Past Social Hx: Meena Martin  reports that she has never smoked. She has never used smokeless tobacco. She reports that she does not drink alcohol and does not use drugs.     Past Family Hx:  Meena Martin family history is not on file.     Problem list, medications, and allergies reviewed by myself today in Epic.     Objective:     /54   Pulse (!) 103   Temp 36.2 °C (97.1 °F) (Temporal)   Resp 18   Ht 1.778 m (5' 10\")   Wt 90.7 kg (200 lb)   SpO2 95%   BMI 28.70 kg/m²     Physical Exam  Constitutional:       Appearance: Normal appearance. She is not ill-appearing or toxic-appearing.   HENT:      Head: Normocephalic.      Right Ear: External ear normal.      Left Ear: External ear " normal.      Nose: Nose normal.      Mouth/Throat:      Lips: Pink.   Eyes:      General: Lids are normal.   Pulmonary:      Effort: Pulmonary effort is normal. No accessory muscle usage.   Abdominal:      Tenderness: There is no abdominal tenderness. There is no right CVA tenderness or left CVA tenderness.   Musculoskeletal:      Cervical back: Full passive range of motion without pain.   Neurological:      Mental Status: She is alert and oriented to person, place, and time.   Psychiatric:         Mood and Affect: Mood normal.         Thought Content: Thought content normal.         Assessment/Plan:     Diagnosis and associated orders:     1. Acute cystitis with hematuria  POCT Urinalysis    URINE CULTURE(NEW)    cefdinir (OMNICEF) 300 MG Cap         Comments/MDM:     Pt. Was given ABX therapy today and will change therapy if culture indicates this is necessary. ER precautions given- worsening symptoms, back pain, abd. Pain, or fevers.   Pt. Is to increase fluids, and take the complete duration of the therapy.   Differential diagnosis, natural history, supportive care, and indications for immediate follow-up discussed.                    Please note that this dictation was created using voice recognition software. I have made a reasonable attempt to correct obvious errors, but I expect that there are errors of grammar and possibly content that I did not discover before finalizing the note.    This note was electronically signed by Moe MATHIS.

## 2025-08-16 ENCOUNTER — HOSPITAL ENCOUNTER (OUTPATIENT)
Facility: MEDICAL CENTER | Age: 80
End: 2025-08-16
Attending: FAMILY MEDICINE
Payer: MEDICARE

## 2025-08-16 ENCOUNTER — OFFICE VISIT (OUTPATIENT)
Dept: URGENT CARE | Facility: PHYSICIAN GROUP | Age: 80
End: 2025-08-16
Payer: MEDICARE

## 2025-08-16 VITALS
RESPIRATION RATE: 24 BRPM | BODY MASS INDEX: 30.51 KG/M2 | DIASTOLIC BLOOD PRESSURE: 74 MMHG | TEMPERATURE: 97 F | SYSTOLIC BLOOD PRESSURE: 138 MMHG | HEIGHT: 69 IN | HEART RATE: 98 BPM | WEIGHT: 206 LBS | OXYGEN SATURATION: 96 %

## 2025-08-16 DIAGNOSIS — R30.0 DYSURIA: ICD-10-CM

## 2025-08-16 DIAGNOSIS — N30.00 ACUTE CYSTITIS WITHOUT HEMATURIA: Primary | ICD-10-CM

## 2025-08-16 DIAGNOSIS — N30.00 ACUTE CYSTITIS WITHOUT HEMATURIA: ICD-10-CM

## 2025-08-16 LAB
APPEARANCE UR: NORMAL
BILIRUB UR STRIP-MCNC: NEGATIVE MG/DL
COLOR UR AUTO: NORMAL
GLUCOSE UR STRIP.AUTO-MCNC: NEGATIVE MG/DL
KETONES UR STRIP.AUTO-MCNC: NEGATIVE MG/DL
LEUKOCYTE ESTERASE UR QL STRIP.AUTO: NORMAL
NITRITE UR QL STRIP.AUTO: POSITIVE
PH UR STRIP.AUTO: 5.5 [PH] (ref 5–8)
PROT UR QL STRIP: NEGATIVE MG/DL
RBC UR QL AUTO: NORMAL
SP GR UR STRIP.AUTO: 1.01
UROBILINOGEN UR STRIP-MCNC: 0.2 MG/DL

## 2025-08-16 PROCEDURE — 87077 CULTURE AEROBIC IDENTIFY: CPT

## 2025-08-16 PROCEDURE — 3075F SYST BP GE 130 - 139MM HG: CPT | Performed by: FAMILY MEDICINE

## 2025-08-16 PROCEDURE — 99213 OFFICE O/P EST LOW 20 MIN: CPT | Performed by: FAMILY MEDICINE

## 2025-08-16 PROCEDURE — 87086 URINE CULTURE/COLONY COUNT: CPT

## 2025-08-16 PROCEDURE — 3078F DIAST BP <80 MM HG: CPT | Performed by: FAMILY MEDICINE

## 2025-08-16 PROCEDURE — 81002 URINALYSIS NONAUTO W/O SCOPE: CPT | Performed by: FAMILY MEDICINE

## 2025-08-16 PROCEDURE — 87186 SC STD MICRODIL/AGAR DIL: CPT

## 2025-08-16 RX ORDER — CEFDINIR 300 MG/1
300 CAPSULE ORAL EVERY 12 HOURS
Qty: 10 CAPSULE | Refills: 0 | Status: SHIPPED | OUTPATIENT
Start: 2025-08-16 | End: 2025-08-21
